# Patient Record
Sex: MALE | Race: WHITE | HISPANIC OR LATINO | Employment: FULL TIME | URBAN - NONMETROPOLITAN AREA
[De-identification: names, ages, dates, MRNs, and addresses within clinical notes are randomized per-mention and may not be internally consistent; named-entity substitution may affect disease eponyms.]

---
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
## 2023-04-02 ENCOUNTER — HOSPITAL ENCOUNTER (EMERGENCY)
Facility: HOSPITAL | Age: 23
Discharge: ANOTHER HOSPITAL | End: 2023-04-02
Attending: EMERGENCY MEDICINE | Admitting: EMERGENCY MEDICINE
Payer: MEDICAID

## 2023-04-02 ENCOUNTER — APPOINTMENT (OUTPATIENT)
Dept: GENERAL RADIOLOGY | Facility: HOSPITAL | Age: 23
End: 2023-04-02
Payer: MEDICAID

## 2023-04-02 VITALS
TEMPERATURE: 97.9 F | HEIGHT: 66 IN | SYSTOLIC BLOOD PRESSURE: 136 MMHG | HEART RATE: 115 BPM | DIASTOLIC BLOOD PRESSURE: 102 MMHG | OXYGEN SATURATION: 99 % | WEIGHT: 95 LBS | RESPIRATION RATE: 18 BRPM | BODY MASS INDEX: 15.27 KG/M2

## 2023-04-02 DIAGNOSIS — S62.615B OPEN DISPLACED FRACTURE OF PROXIMAL PHALANX OF LEFT RING FINGER, INITIAL ENCOUNTER: Primary | ICD-10-CM

## 2023-04-02 PROCEDURE — 90471 IMMUNIZATION ADMIN: CPT | Performed by: EMERGENCY MEDICINE

## 2023-04-02 PROCEDURE — 25010000002 TETANUS-DIPHTH-ACELL PERTUSSIS 5-2.5-18.5 LF-MCG/0.5 SUSPENSION PREFILLED SYRINGE: Performed by: EMERGENCY MEDICINE

## 2023-04-02 PROCEDURE — 0 LIDOCAINE 1 % SOLUTION: Performed by: EMERGENCY MEDICINE

## 2023-04-02 PROCEDURE — 25010000002 MORPHINE PER 10 MG: Performed by: EMERGENCY MEDICINE

## 2023-04-02 PROCEDURE — 90715 TDAP VACCINE 7 YRS/> IM: CPT | Performed by: EMERGENCY MEDICINE

## 2023-04-02 PROCEDURE — 73130 X-RAY EXAM OF HAND: CPT

## 2023-04-02 PROCEDURE — 96365 THER/PROPH/DIAG IV INF INIT: CPT

## 2023-04-02 PROCEDURE — 25010000002 ONDANSETRON PER 1 MG: Performed by: EMERGENCY MEDICINE

## 2023-04-02 PROCEDURE — 0 CEFAZOLIN SODIUM-DEXTROSE 2-3 GM-%(50ML) RECONSTITUTED SOLUTION: Performed by: EMERGENCY MEDICINE

## 2023-04-02 PROCEDURE — 96375 TX/PRO/DX INJ NEW DRUG ADDON: CPT

## 2023-04-02 PROCEDURE — 99284 EMERGENCY DEPT VISIT MOD MDM: CPT

## 2023-04-02 RX ORDER — CEFAZOLIN SODIUM 2 G/50ML
2 SOLUTION INTRAVENOUS ONCE
Status: COMPLETED | OUTPATIENT
Start: 2023-04-02 | End: 2023-04-02

## 2023-04-02 RX ORDER — LIDOCAINE HYDROCHLORIDE 10 MG/ML
10 INJECTION, SOLUTION INFILTRATION; PERINEURAL ONCE
Status: COMPLETED | OUTPATIENT
Start: 2023-04-02 | End: 2023-04-02

## 2023-04-02 RX ORDER — ONDANSETRON 2 MG/ML
4 INJECTION INTRAMUSCULAR; INTRAVENOUS ONCE
Status: COMPLETED | OUTPATIENT
Start: 2023-04-02 | End: 2023-04-02

## 2023-04-02 RX ORDER — SODIUM CHLORIDE 0.9 % (FLUSH) 0.9 %
10 SYRINGE (ML) INJECTION AS NEEDED
Status: DISCONTINUED | OUTPATIENT
Start: 2023-04-02 | End: 2023-04-02 | Stop reason: HOSPADM

## 2023-04-02 RX ADMIN — ONDANSETRON 4 MG: 2 INJECTION INTRAMUSCULAR; INTRAVENOUS at 19:49

## 2023-04-02 RX ADMIN — MORPHINE SULFATE 4 MG: 4 INJECTION, SOLUTION INTRAMUSCULAR; INTRAVENOUS at 19:49

## 2023-04-02 RX ADMIN — TETANUS TOXOID, REDUCED DIPHTHERIA TOXOID AND ACELLULAR PERTUSSIS VACCINE, ADSORBED 0.5 ML: 5; 2.5; 8; 8; 2.5 SUSPENSION INTRAMUSCULAR at 19:49

## 2023-04-02 RX ADMIN — CEFAZOLIN SODIUM 2 G: 2 SOLUTION INTRAVENOUS at 20:10

## 2023-04-02 RX ADMIN — LIDOCAINE HYDROCHLORIDE 10 ML: 10 INJECTION, SOLUTION INFILTRATION; PERINEURAL at 20:11

## 2023-04-03 NOTE — ED PROVIDER NOTES
"Subjective  History of Present Illness:    Chief Complaint: Finger laceration/deformity  History of Present Illness: 22-year-old male presents after having his finger slammed in a truck door.  Left fourth finger, patient is right-hand dominant.  No exposed bone    Nurses Notes reviewed and agree, including vitals, allergies, social history and prior medical history.     REVIEW OF SYSTEMS: All systems reviewed and not pertinent unless noted.  Review of Systems      Positive for: Open fracture with finger deformity left fourth finger proximal phalange    Negative for: Profuse bleeding    History reviewed. No pertinent past medical history.    Allergies:    Patient has no known allergies.      History reviewed. No pertinent surgical history.      Social History     Socioeconomic History   • Marital status: Single   Substance and Sexual Activity   • Drug use: Yes     Types: Marijuana         History reviewed. No pertinent family history.    Objective  Physical Exam:  BP (!) 136/102 (BP Location: Right arm, Patient Position: Sitting)   Pulse 115   Temp 97.9 °F (36.6 °C) (Oral)   Resp 18   Ht 167.6 cm (66\")   Wt 43.1 kg (95 lb)   SpO2 99%   BMI 15.33 kg/m²      Physical Exam    CONSTITUTIONAL: Well developed, 22-year-old ,  in no acute distress.  VITAL SIGNS: per nursing, reviewed and noted  SKIN: exposed skin with laceration left fourth digit in 2 planes, transverse oriented across the MCP throughout the entirety of the palmar aspect.  Now laceration extends longitudinally along the medial aspect of the proximal phalanx through the dorsal aspect of the hand.  Approximately 4 cm laceration in total.  The left fourth finger is maintained in flexed state, complete dorsal angulation with open fracture and rotated towards the thumb.  Decreased cap refill  EYES: Grossly EOMI, no icterus  ENT: Normal voice.  Moist mucous membranes   RESPIRATORY:  No increased work of breathing. No retractions.   MUSCULOSKELETAL: " Age-appropriate bulk and tone, aforementioned open fracture with angulation of the proximal phalanx of the left fourth finger  NEUROLOGIC: Alert, oriented x 3. No gross deficits. GCS 15.   PSYCH: appropriate affect.  : no bladder tenderness or distention, no CVA tenderness    Procedures     Laceration Repair/fracture reduction: Left fourth finger  Consent obtained, discussed with patient all risks and benefits.   Patient underwent sterile prep technique with saline irrigation as well as chlorhexidine lidocaine 1% digital block  Anesthesia was obtained with lidocaine 1% digital block  Laceration was closed with 6 running interlocking 4-0 Ethilon loose sutures along the longitudinal component.  Flexor surface closed with 2 interrupted 4-0 Ethilon sutures after reduction as best possible to near anatomical position of the proximal phalanx fracture.  The fracture was unable to be reduced underneath the flexor tendon and as such the patient finger still maintained in a flexed position.  Improvement of cap refill postprocedure  Dressing wet-to-dry dressing, dorsal splint 4 inch Ortho-Glass  Patient was examined post procedure   Tolerated well    ED Course:    Lab Results (last 24 hours)     ** No results found for the last 24 hours. **           No radiology results from the last 24 hrs     MDM  Initial presentation                 Post reduction and loose closure.         Medical Decision Making:    Initial impression of presenting illness: Patient presents with open finger fracture and finger deformity after having his finger closed in a car door    DDX: includes but is not limited to: Open fracture, tendon injury,         Patient arrives afebrile tachycardic sats 99% room with vitals interpreted by myself.     Pertinent features from physical exam:  laceration left fourth digit in 2 planes, transverse oriented across the MCP throughout the entirety of the palmar aspect.  Now laceration extends longitudinally along  the medial aspect of the proximal phalanx through the dorsal aspect of the hand.  Approximately 4 cm laceration in total.  The left fourth finger is maintained in flexed state, complete dorsal angulation with open fracture and rotated towards the thumb.  Decreased cap refill  .    Initial diagnostic plan: Updated tetanus, Ancef, pain control, initial reduction, transfer for higher level of care for hand surgery, plain film    Results from initial plan were reviewed and interpreted by me revealing proximal fourth phalanx fracture with significant angulation    Diagnostic information from other sources: None    Interventions / Re-evaluation: Improved alignment post procedure    Results/clinical rationale were discussed with patient, Dr. Florentino, [facility], will accept in transfer   -----      Final diagnoses:   Open displaced fracture of proximal phalanx of left ring finger, initial encounter        Arnie Oliva, DO  04/02/23 2035       Arnie Oliva, DO  04/02/23 2040       Arnie Oliva, DO  04/03/23 0519

## 2025-05-19 ENCOUNTER — TRANSCRIBE ORDERS (OUTPATIENT)
Dept: ADMINISTRATIVE | Facility: HOSPITAL | Age: 25
End: 2025-05-19
Payer: MEDICAID

## 2025-05-19 DIAGNOSIS — R10.9 ACUTE ABDOMINAL PAIN: Primary | ICD-10-CM
